# Patient Record
Sex: FEMALE | Race: OTHER | NOT HISPANIC OR LATINO | URBAN - METROPOLITAN AREA
[De-identification: names, ages, dates, MRNs, and addresses within clinical notes are randomized per-mention and may not be internally consistent; named-entity substitution may affect disease eponyms.]

---

## 2017-04-17 ENCOUNTER — EMERGENCY (EMERGENCY)
Facility: HOSPITAL | Age: 72
LOS: 1 days | Discharge: HOME | End: 2017-04-17
Attending: EMERGENCY MEDICINE | Admitting: EMERGENCY MEDICINE
Payer: MEDICARE

## 2017-04-17 VITALS
SYSTOLIC BLOOD PRESSURE: 122 MMHG | RESPIRATION RATE: 18 BRPM | HEART RATE: 71 BPM | DIASTOLIC BLOOD PRESSURE: 77 MMHG | TEMPERATURE: 97 F | OXYGEN SATURATION: 97 %

## 2017-04-17 DIAGNOSIS — H11.32 CONJUNCTIVAL HEMORRHAGE, LEFT EYE: ICD-10-CM

## 2017-04-17 DIAGNOSIS — I10 ESSENTIAL (PRIMARY) HYPERTENSION: ICD-10-CM

## 2017-04-17 PROCEDURE — 99282 EMERGENCY DEPT VISIT SF MDM: CPT

## 2017-04-17 NOTE — ED PROVIDER NOTE - OBJECTIVE STATEMENT
71 yo F w/ hx of htn, c/o L eye redness x4 days and blurry vision x3 days. States that on that on Wednesday, she visited her doctor in [place], notes she had bp of 150, normal is usually 120. The next day, she noticed L eye redness and on Friday, pt noticed blurriness in eye along with pain with eye movement localized in the middle of eye. Pt does not wear glasses or contact lenses. 71 yo F w/ hx of htn, c/o L eye redness x4 days and blurry vision x3 days. States that on that on Wednesday, she visited her doctor in [place], notes she had bp of 150, normal is usually 120. The next day, she noticed L eye redness and on Friday, pt noticed blurriness in eye along with pain with eye movement localized in the middle of eye. Pt does not wear glasses or contact lenses. last eye exam 5-6 years ago

## 2017-04-17 NOTE — ED PROVIDER NOTE - EYES, MLM
Positive left subconjunctival hemorrhage. EOMI PERRL. Visual acuity; 10/20 in L eye, 10/25 in R eye, 10x12.5 both eyes. Visual fields intact bilaterally. Normal anterior chamber bilaterally.

## 2017-04-17 NOTE — ED PROVIDER NOTE - NS ED MD SCRIBE ATTENDING SCRIBE SECTIONS
HISTORY OF PRESENT ILLNESS/PAST MEDICAL/SURGICAL/SOCIAL HISTORY/OBSERVATION MONITORING PLAN/REVIEW OF SYSTEMS/PHYSICAL EXAM/DISPOSITION

## 2017-04-17 NOTE — ED PROVIDER NOTE - HEAD, MLM
Head is atraumatic. Head shape is symmetrical. Head is atraumatic. Head shape is symmetrical. no orbital signs, no proptosis

## 2017-04-17 NOTE — ED ADULT NURSE NOTE - OBJECTIVE STATEMENT
Patient is a 72 y/o female c.o left eye redness x 4 days. Patient states she started having left eye redness 4 days ago and blurry vision to eye 3 days ago. Patient reports pain when moving eye, redness, and blurry vision. Patient denies trauma, HA.

## 2024-07-19 NOTE — ED PROVIDER NOTE - SKIN, MLM
The patient's goals for the shift include Patient will remain safe and free from injury    The clinical goals for the shift include patient to have adequate pain control    Over the shift, the patient did not make progress toward the following goals. Barriers to progression include still having pain . Recommendations to address these barriers include pain med increased, pt to discharge.     Skin normal color for race, warm, dry and intact.